# Patient Record
Sex: FEMALE | Race: ASIAN | NOT HISPANIC OR LATINO | URBAN - METROPOLITAN AREA
[De-identification: names, ages, dates, MRNs, and addresses within clinical notes are randomized per-mention and may not be internally consistent; named-entity substitution may affect disease eponyms.]

---

## 2017-01-18 RX ORDER — GLIMEPIRIDE 2 MG/1
TABLET ORAL
Qty: 30 TABLET | Refills: 2 | Status: SHIPPED | OUTPATIENT
Start: 2017-01-18 | End: 2017-02-27 | Stop reason: SDUPTHER

## 2017-02-24 ENCOUNTER — LAB (OUTPATIENT)
Dept: INTERNAL MEDICINE | Facility: CLINIC | Age: 57
End: 2017-02-24

## 2017-02-24 DIAGNOSIS — R06.02 SHORTNESS OF BREATH: ICD-10-CM

## 2017-02-24 DIAGNOSIS — R79.89 ABNORMAL LIVER FUNCTION TESTS: ICD-10-CM

## 2017-02-24 DIAGNOSIS — E78.5 HYPERLIPIDEMIA, UNSPECIFIED HYPERLIPIDEMIA TYPE: ICD-10-CM

## 2017-02-24 DIAGNOSIS — E11.9 CONTROLLED TYPE 2 DIABETES MELLITUS WITHOUT COMPLICATION, WITHOUT LONG-TERM CURRENT USE OF INSULIN (HCC): Primary | ICD-10-CM

## 2017-02-24 LAB
ALBUMIN SERPL-MCNC: 4.3 G/DL (ref 3.2–4.8)
ALBUMIN/GLOB SERPL: 1.9 G/DL (ref 1.5–2.5)
ALP SERPL-CCNC: 56 U/L (ref 25–100)
ALT SERPL W P-5'-P-CCNC: 30 U/L (ref 7–40)
ANION GAP SERPL CALCULATED.3IONS-SCNC: 4 MMOL/L (ref 3–11)
ARTICHOKE IGE QN: 118 MG/DL (ref 0–130)
AST SERPL-CCNC: 22 U/L (ref 0–33)
BACTERIA UR QL AUTO: ABNORMAL /HPF
BASOPHILS # BLD AUTO: 0.01 10*3/MM3 (ref 0–0.2)
BASOPHILS NFR BLD AUTO: 0.2 % (ref 0–1)
BILIRUB SERPL-MCNC: 0.5 MG/DL (ref 0.3–1.2)
BILIRUB UR QL STRIP: NEGATIVE
BUN BLD-MCNC: 14 MG/DL (ref 9–23)
BUN/CREAT SERPL: 23.3 (ref 7–25)
CALCIUM SPEC-SCNC: 9.9 MG/DL (ref 8.7–10.4)
CHLORIDE SERPL-SCNC: 107 MMOL/L (ref 99–109)
CHOLEST SERPL-MCNC: 207 MG/DL (ref 0–200)
CK SERPL-CCNC: 45 U/L (ref 26–174)
CLARITY UR: CLEAR
CO2 SERPL-SCNC: 33 MMOL/L (ref 20–31)
COLOR UR: YELLOW
CREAT BLD-MCNC: 0.6 MG/DL (ref 0.6–1.3)
DEPRECATED RDW RBC AUTO: 40.4 FL (ref 37–54)
EOSINOPHIL # BLD AUTO: 0.04 10*3/MM3 (ref 0.1–0.3)
EOSINOPHIL NFR BLD AUTO: 0.9 % (ref 0–3)
ERYTHROCYTE [DISTWIDTH] IN BLOOD BY AUTOMATED COUNT: 11.9 % (ref 11.3–14.5)
GFR SERPL CREATININE-BSD FRML MDRD: 102 ML/MIN/1.73
GFR SERPL CREATININE-BSD FRML MDRD: 124 ML/MIN/1.73
GLOBULIN UR ELPH-MCNC: 2.3 GM/DL
GLUCOSE BLD-MCNC: 127 MG/DL (ref 70–100)
GLUCOSE UR STRIP-MCNC: NEGATIVE MG/DL
HBA1C MFR BLD: 6.3 % (ref 4.8–5.6)
HCT VFR BLD AUTO: 42.1 % (ref 34.5–44)
HCV AB SER DONR QL: NORMAL
HDLC SERPL-MCNC: 74 MG/DL (ref 40–60)
HGB BLD-MCNC: 13.2 G/DL (ref 11.5–15.5)
HGB UR QL STRIP.AUTO: ABNORMAL
HYALINE CASTS UR QL AUTO: ABNORMAL /LPF
IMM GRANULOCYTES # BLD: 0.01 10*3/MM3 (ref 0–0.03)
IMM GRANULOCYTES NFR BLD: 0.2 % (ref 0–0.6)
KETONES UR QL STRIP: NEGATIVE
LEUKOCYTE ESTERASE UR QL STRIP.AUTO: ABNORMAL
LYMPHOCYTES # BLD AUTO: 1.3 10*3/MM3 (ref 0.6–4.8)
LYMPHOCYTES NFR BLD AUTO: 28 % (ref 24–44)
MCH RBC QN AUTO: 29.1 PG (ref 27–31)
MCHC RBC AUTO-ENTMCNC: 31.4 G/DL (ref 32–36)
MCV RBC AUTO: 92.9 FL (ref 80–99)
MONOCYTES # BLD AUTO: 0.27 10*3/MM3 (ref 0–1)
MONOCYTES NFR BLD AUTO: 5.8 % (ref 0–12)
NEUTROPHILS # BLD AUTO: 3.02 10*3/MM3 (ref 1.5–8.3)
NEUTROPHILS NFR BLD AUTO: 64.9 % (ref 41–71)
NITRITE UR QL STRIP: NEGATIVE
PH UR STRIP.AUTO: 6 [PH] (ref 5–8)
PLATELET # BLD AUTO: 264 10*3/MM3 (ref 150–450)
PMV BLD AUTO: 9.3 FL (ref 6–12)
POTASSIUM BLD-SCNC: 4.2 MMOL/L (ref 3.5–5.5)
PROT SERPL-MCNC: 6.6 G/DL (ref 5.7–8.2)
PROT UR QL STRIP: NEGATIVE
RBC # BLD AUTO: 4.53 10*6/MM3 (ref 3.89–5.14)
RBC # UR: ABNORMAL /HPF
REF LAB TEST METHOD: ABNORMAL
SODIUM BLD-SCNC: 144 MMOL/L (ref 132–146)
SP GR UR STRIP: 1.02 (ref 1–1.03)
SQUAMOUS #/AREA URNS HPF: ABNORMAL /HPF
TRIGL SERPL-MCNC: 94 MG/DL (ref 0–150)
TSH SERPL DL<=0.05 MIU/L-ACNC: 2.06 MIU/ML (ref 0.35–5.35)
UROBILINOGEN UR QL STRIP: ABNORMAL
WBC NRBC COR # BLD: 4.65 10*3/MM3 (ref 3.5–10.8)
WBC UR QL AUTO: ABNORMAL /HPF

## 2017-02-24 PROCEDURE — 86803 HEPATITIS C AB TEST: CPT | Performed by: INTERNAL MEDICINE

## 2017-02-24 PROCEDURE — 84443 ASSAY THYROID STIM HORMONE: CPT | Performed by: INTERNAL MEDICINE

## 2017-02-24 PROCEDURE — 81001 URINALYSIS AUTO W/SCOPE: CPT | Performed by: INTERNAL MEDICINE

## 2017-02-24 PROCEDURE — 85025 COMPLETE CBC W/AUTO DIFF WBC: CPT | Performed by: INTERNAL MEDICINE

## 2017-02-24 PROCEDURE — 80053 COMPREHEN METABOLIC PANEL: CPT | Performed by: INTERNAL MEDICINE

## 2017-02-24 PROCEDURE — 83036 HEMOGLOBIN GLYCOSYLATED A1C: CPT | Performed by: INTERNAL MEDICINE

## 2017-02-24 PROCEDURE — 82043 UR ALBUMIN QUANTITATIVE: CPT | Performed by: INTERNAL MEDICINE

## 2017-02-24 PROCEDURE — 82570 ASSAY OF URINE CREATININE: CPT | Performed by: INTERNAL MEDICINE

## 2017-02-24 PROCEDURE — 80061 LIPID PANEL: CPT | Performed by: INTERNAL MEDICINE

## 2017-02-24 PROCEDURE — 82550 ASSAY OF CK (CPK): CPT | Performed by: INTERNAL MEDICINE

## 2017-02-26 LAB
CREAT 24H UR-MCNC: 92.8 MG/DL
MICROALB/CRT. RATIO UR: 10.6 MG/G CREAT (ref 0–30)
MICROALBUMIN UR-MCNC: 9.8 UG/ML

## 2017-02-27 ENCOUNTER — OFFICE VISIT (OUTPATIENT)
Dept: INTERNAL MEDICINE | Facility: CLINIC | Age: 57
End: 2017-02-27

## 2017-02-27 VITALS
DIASTOLIC BLOOD PRESSURE: 70 MMHG | BODY MASS INDEX: 20.81 KG/M2 | HEIGHT: 60 IN | SYSTOLIC BLOOD PRESSURE: 118 MMHG | WEIGHT: 106 LBS

## 2017-02-27 DIAGNOSIS — E78.00 PURE HYPERCHOLESTEROLEMIA: ICD-10-CM

## 2017-02-27 DIAGNOSIS — Z00.00 PE (PHYSICAL EXAM), ROUTINE: Primary | ICD-10-CM

## 2017-02-27 DIAGNOSIS — Z12.11 ENCOUNTER FOR SCREENING COLONOSCOPY: ICD-10-CM

## 2017-02-27 DIAGNOSIS — Z78.0 MENOPAUSE: ICD-10-CM

## 2017-02-27 DIAGNOSIS — Z12.31 VISIT FOR SCREENING MAMMOGRAM: ICD-10-CM

## 2017-02-27 DIAGNOSIS — M85.80 OSTEOPENIA: ICD-10-CM

## 2017-02-27 DIAGNOSIS — E11.9 CONTROLLED TYPE 2 DIABETES MELLITUS WITHOUT COMPLICATION, WITHOUT LONG-TERM CURRENT USE OF INSULIN (HCC): ICD-10-CM

## 2017-02-27 PROCEDURE — 93000 ELECTROCARDIOGRAM COMPLETE: CPT | Performed by: INTERNAL MEDICINE

## 2017-02-27 PROCEDURE — 99396 PREV VISIT EST AGE 40-64: CPT | Performed by: INTERNAL MEDICINE

## 2017-02-27 RX ORDER — GLIMEPIRIDE 2 MG/1
2 TABLET ORAL
Qty: 30 TABLET | Refills: 5 | Status: SHIPPED | OUTPATIENT
Start: 2017-02-27 | End: 2017-10-02 | Stop reason: SDUPTHER

## 2017-02-27 NOTE — ASSESSMENT & PLAN NOTE
BG control bord with A1C 6.3; encouraged reg phys activity to decr insulin resistance, moderation in unhealthy starches/sweets; remains on met 1000mg BID #180, 5RF, glimepiride 2mg QD #30, 5RF; f/u A1C in 3 mos

## 2017-02-27 NOTE — PROGRESS NOTES
"Chief Complaint   Patient presents with   • Annual Exam       History of Present Illness  57 y.o.  woman presents for updated phys examination.  Notes trip to [facility] and lots of food.  Has not been exercising.  Has not been checking BGs but remains compliant with meds.  Cannot recall last mammo, within last few years.    Review of Systems  Denies headaches, visual changes, CP, palpitations, SOB, cough, abd pain, n/v/d, difficulty with urination, vaginal discharge/bleeding, numbness/tingling, falls, mood changes, lightheadedness, hearing changes, rashes.    Notes chronic irritation left ear with itching and flakiness, which she attributes to using cell phone in that ear.    Notes still fullness sensation in left eye and was told it would take up to 1 year to get back to baseline.  Had left eye surgery with Dr. Mendieta in 11/16 and has f/u in 3/17.     All other ROS reviewed and negative.    [facility]  The following portions of the patient's history were reviewed and updated as appropriate: allergies, current medications, past family history, past medical history, past social history, past surgical history and problem list.      Current Outpatient Prescriptions:   •  aspirin 81 MG tablet, Take  by mouth., Disp: , Rfl:   •  Calcium Carbonate-Vitamin D (CALCIUM 500 + D PO), Take  by mouth., Disp: , Rfl:   •  Cinnamon 500 MG capsule, Take 1 capsule by mouth daily., Disp: , Rfl:   •  glimepiride (AMARYL) 2 MG tablet, Take 1 tablet by mouth Every Morning Before Breakfast., Disp: 30 tablet, Rfl: 5  •  glucose blood (ACCU-CHEK NANCY PLUS) test strip, Test blood sugar once daily as directed, Disp: , Rfl:   •  metFORMIN (GLUCOPHAGE) 500 MG tablet, Take 2 tablets by mouth 2 (Two) Times a Day With Meals., Disp: 120 tablet, Rfl: 5  •  Omega-3 Fatty Acids (FISH OIL PO), Take  by mouth., Disp: , Rfl:     Visit Vitals   • /70   • Ht 59.5\" (151.1 cm)   • Wt 106 lb (48.1 kg)   • BMI 21.05 kg/m2       Physical Exam "   Constitutional: She is oriented to person, place, and time. She appears well-developed and well-nourished.   HENT:   Head: Normocephalic.   Right Ear: Tympanic membrane normal. No decreased hearing is noted.   Left Ear: Tympanic membrane normal. No decreased hearing is noted.   Nose: Nose normal.   Mouth/Throat: Oropharynx is clear and moist and mucous membranes are normal. No oropharyngeal exudate.   R. aud canal occluded by cerumen; L. aud canal with flaky mildly swollen erythema   Eyes: Conjunctivae and EOM are normal. Pupils are equal, round, and reactive to light.   bilat eyelids mildly puffy, mild ptosis, L>R but almost symmetric   Neck: Normal range of motion. Neck supple. Carotid bruit is not present. No thyromegaly present.   Cardiovascular: Normal rate, regular rhythm and normal heart sounds.    Pulmonary/Chest: Effort normal and breath sounds normal. No respiratory distress.   Abdominal: Soft. Bowel sounds are normal. She exhibits no distension and no mass. There is no hepatosplenomegaly. There is no tenderness.   Genitourinary:   Genitourinary Comments: Breast exam unremarkable without masses, skin changes, nipple discharge, or axillary adenopathy.     Musculoskeletal: Normal range of motion. She exhibits no edema.   Lymphadenopathy:     She has no cervical adenopathy.   Neurological: She is alert and oriented to person, place, and time. She has normal strength and normal reflexes. She displays normal reflexes. No cranial nerve deficit or sensory deficit.   Skin: Skin is warm and dry. No rash noted.   Psychiatric: She has a normal mood and affect. Her behavior is normal.   Nursing note and vitals reviewed.      LABS  Results for orders placed or performed in visit on 02/24/17   Comprehensive metabolic panel   Result Value Ref Range    Glucose 127 (H) 70 - 100 mg/dL    BUN 14 9 - 23 mg/dL    Creatinine 0.60 0.60 - 1.30 mg/dL    Sodium 144 132 - 146 mmol/L    Potassium 4.2 3.5 - 5.5 mmol/L    Chloride  107 99 - 109 mmol/L    CO2 33.0 (H) 20.0 - 31.0 mmol/L    Calcium 9.9 8.7 - 10.4 mg/dL    Total Protein 6.6 5.7 - 8.2 g/dL    Albumin 4.30 3.20 - 4.80 g/dL    ALT (SGPT) 30 7 - 40 U/L    AST (SGOT) 22 0 - 33 U/L    Alkaline Phosphatase 56 25 - 100 U/L    Total Bilirubin 0.5 0.3 - 1.2 mg/dL    eGFR Non African Amer 102 >60 mL/min/1.73    eGFR  African Amer 124 >60 mL/min/1.73    Globulin 2.3 gm/dL    A/G Ratio 1.9 1.5 - 2.5 g/dL    BUN/Creatinine Ratio 23.3 7.0 - 25.0    Anion Gap 4.0 3.0 - 11.0 mmol/L   TSH   Result Value Ref Range    TSH 2.060 0.350 - 5.350 mIU/mL   Lipid panel   Result Value Ref Range    Total Cholesterol 207 (H) 0 - 200 mg/dL    Triglycerides 94 0 - 150 mg/dL    HDL Cholesterol 74 (H) 40 - 60 mg/dL    LDL Cholesterol  118 0 - 130 mg/dL   Hepatitis C antibody   Result Value Ref Range    Hepatitis C Ab Non-Reactive Non-Reactive   Urinalysis With / Microscopic If Indicated   Result Value Ref Range    Color, UA Yellow Yellow, Straw    Appearance, UA Clear Clear    pH, UA 6.0 5.0 - 8.0    Specific Gravity, UA 1.018 1.001 - 1.030    Glucose, UA Negative Negative    Ketones, UA Negative Negative    Bilirubin, UA Negative Negative    Blood, UA Trace (A) Negative    Protein, UA Negative Negative    Leuk Esterase, UA Small (1+) (A) Negative    Nitrite, UA Negative Negative    Urobilinogen, UA 0.2 E.U./dL 0.2 - 1.0 E.U./dL   Hemoglobin A1c   Result Value Ref Range    Hemoglobin A1C 6.30 (H) 4.80 - 5.60 %   Microalbumin / Creatinine Urine Ratio   Result Value Ref Range    Creatinine, Urine 92.8 Not Estab. mg/dL    Microalbumin, Urine 9.8 Not Estab. ug/mL    Microalbumin/Creatinine Ratio Urine 10.6 0.0 - 30.0 mg/g creat   CK   Result Value Ref Range    Creatine Kinase 45 26 - 174 U/L   CBC Auto Differential   Result Value Ref Range    WBC 4.65 3.50 - 10.80 10*3/mm3    RBC 4.53 3.89 - 5.14 10*6/mm3    Hemoglobin 13.2 11.5 - 15.5 g/dL    Hematocrit 42.1 34.5 - 44.0 %    MCV 92.9 80.0 - 99.0 fL    MCH 29.1 27.0  - 31.0 pg    MCHC 31.4 (L) 32.0 - 36.0 g/dL    RDW 11.9 11.3 - 14.5 %    RDW-SD 40.4 37.0 - 54.0 fl    MPV 9.3 6.0 - 12.0 fL    Platelets 264 150 - 450 10*3/mm3    Neutrophil % 64.9 41.0 - 71.0 %    Lymphocyte % 28.0 24.0 - 44.0 %    Monocyte % 5.8 0.0 - 12.0 %    Eosinophil % 0.9 0.0 - 3.0 %    Basophil % 0.2 0.0 - 1.0 %    Immature Grans % 0.2 0.0 - 0.6 %    Neutrophils, Absolute 3.02 1.50 - 8.30 10*3/mm3    Lymphocytes, Absolute 1.30 0.60 - 4.80 10*3/mm3    Monocytes, Absolute 0.27 0.00 - 1.00 10*3/mm3    Eosinophils, Absolute 0.04 (L) 0.10 - 0.30 10*3/mm3    Basophils, Absolute 0.01 0.00 - 0.20 10*3/mm3    Immature Grans, Absolute 0.01 0.00 - 0.03 10*3/mm3   Urinalysis, Microscopic Only   Result Value Ref Range    RBC, UA 3-6 (A) None Seen, 0-2 /HPF    WBC, UA 3-5 (A) None Seen /HPF    Bacteria, UA None Seen None Seen, Trace /HPF    Squamous Epithelial Cells, UA 0-2 None Seen, 0-2 /HPF    Hyaline Casts, UA 0-6 0 - 6 /LPF    Methodology Automated Microscopy        ECG 12 Lead  Date/Time: 2/27/2017 3:12 PM  Performed by: MARK REHMAN  Authorized by: MARK REHMAN   Comparison: compared with previous ECG from 2/16/2016  Comparison to previous ECG: Resolved inverted T wave in V2  Rhythm: sinus rhythm  Rate: normal  BPM: 81  Conduction: conduction normal  ST Segments: ST segments normal  T Waves: T waves normal  QRS axis: normal  Clinical impression comment: stable EKG              ASSESSMENT/PLAN  Problem List Items Addressed This Visit     Controlled type 2 diabetes mellitus without complication     BG control bord with A1C 6.3; encouraged reg phys activity to decr insulin resistance, moderation in unhealthy starches/sweets; remains on met 1000mg BID #180, 5RF, glimepiride 2mg QD #30, 5RF; f/u A1C in 3 mos           Relevant Medications    metFORMIN (GLUCOPHAGE) 500 MG tablet    glimepiride (AMARYL) 2 MG tablet    Hyperlipidemia     Lipids excellent; no meds         Relevant Orders    ECG 12 Lead    Menopause     Relevant Orders    DEXA Bone Density Axial    Osteopenia     Calcium and vitamin D supplementation with weight-bearing exercise; DEXA ordered            PE (physical exam), routine - Primary     Health maintenance - flu vacc 12/16; Tdap 4/11, PVX given today; rec Zostavax after turns 58; mammo order given to patient; Pap 2/16, repeat 2019 unless abnlities; DEXA ordered (9/14); colonosc due with Dr. Reyes (last 5/11, (+)FH), eye exam with Dr. Mendieta 11/16, with f/u 3/17; dental exam 1/17, done every 6 mos           Other Visit Diagnoses     Visit for screening mammogram        Relevant Orders    Mammo Screening Digital Tomosynthesis Bilateral With CAD    Encounter for screening colonoscopy        Relevant Orders    Ambulatory Referral to Colorectal Surgery          FOLLOW-UP   RTC 3 mos with A1C; f/u A1C and lipids in 6 mos    Electronically signed by:    Jacinta Lopez MD  02/27/2017

## 2017-02-27 NOTE — ASSESSMENT & PLAN NOTE
Health maintenance - flu vacc 12/16; Tdap 4/11, PVX given today; rec Zostavax after turns 58; mammo order given to patient; Pap 2/16, repeat 2019 unless abnlities; DEXA ordered (9/14); colonosc due with Dr. Reyes (last 5/11, (+)), eye exam with Dr. Mendieta 11/16, with f/u 3/17; dental exam 1/17, done every 6 mos

## 2017-06-23 ENCOUNTER — OFFICE VISIT (OUTPATIENT)
Dept: INTERNAL MEDICINE | Facility: CLINIC | Age: 57
End: 2017-06-23

## 2017-06-23 VITALS
DIASTOLIC BLOOD PRESSURE: 72 MMHG | BODY MASS INDEX: 19.63 KG/M2 | WEIGHT: 100 LBS | SYSTOLIC BLOOD PRESSURE: 110 MMHG | HEIGHT: 60 IN

## 2017-06-23 DIAGNOSIS — E11.9 CONTROLLED TYPE 2 DIABETES MELLITUS WITHOUT COMPLICATION, WITHOUT LONG-TERM CURRENT USE OF INSULIN (HCC): Primary | ICD-10-CM

## 2017-06-23 DIAGNOSIS — E78.00 PURE HYPERCHOLESTEROLEMIA: ICD-10-CM

## 2017-06-23 LAB — HBA1C MFR BLD: 6.2 %

## 2017-06-23 PROCEDURE — 99213 OFFICE O/P EST LOW 20 MIN: CPT | Performed by: INTERNAL MEDICINE

## 2017-06-23 PROCEDURE — 83036 HEMOGLOBIN GLYCOSYLATED A1C: CPT | Performed by: INTERNAL MEDICINE

## 2017-06-23 NOTE — PROGRESS NOTES
"Chief Complaint   Patient presents with   • Follow-up     Diabetes        History of Present Illness  57 y.o.  woman, accompanied by her , presents for DM follow-up.  Has not been checking a lot of BGs, but FBGs have been in the 120s.  Denies low sugars or other concerns today.    Review of Systems  ROS neg for CP, palpitations, SOB, headaches, numbness/tingling.    Has ongoing eye issues barbara on the left side.  Had previous retinal surgery and then more recent cataract surgery.  Will just be monitoring the right cataract for now.  Also on eye drops for glaucoma.  Still have spots she cannot see; also worried about elevated eye pressures, noting the most recent was decreased.  Sees Dr. Mendieta every 3 mos. All other ROS reviewed and negative.    [facility]  The following portions of the patient's history were reviewed and updated as appropriate: allergies, current medications, past family history, past medical history, past social history, past surgical history and problem list.      Current Outpatient Prescriptions:   •  aspirin 81 MG tablet, Take  by mouth., Disp: , Rfl:   •  Calcium Carbonate-Vitamin D (CALCIUM 500 + D PO), Take  by mouth., Disp: , Rfl:   •  Cinnamon 500 MG capsule, Take 1 capsule by mouth daily., Disp: , Rfl:   •  glimepiride (AMARYL) 2 MG tablet, Take 1 tablet by mouth Every Morning Before Breakfast., Disp: 30 tablet, Rfl: 5  •  glucose blood (ACCU-CHEK NANCY PLUS) test strip, Test blood sugar once daily as directed, Disp: , Rfl:   •  metFORMIN (GLUCOPHAGE) 500 MG tablet, Take 2 tablets by mouth 2 (Two) Times a Day With Meals., Disp: 120 tablet, Rfl: 5  •  Omega-3 Fatty Acids (FISH OIL PO), Take  by mouth., Disp: , Rfl:     VITALS:  /72  Ht 59.5\" (151.1 cm)  Wt 100 lb (45.4 kg)  BMI 19.86 kg/m2    Physical Exam   Constitutional: She is oriented to person, place, and time. She appears well-developed and well-nourished.   Eyes: Conjunctivae and EOM are normal.   Cardiovascular: " Normal rate, regular rhythm and normal heart sounds.    Pulmonary/Chest: Effort normal and breath sounds normal.   Abdominal: Soft. Bowel sounds are normal.   Neurological: She is alert and oriented to person, place, and time.   Psychiatric: She has a normal mood and affect. Her behavior is normal.   Nursing note and vitals reviewed.      LABS  Results for orders placed or performed in visit on 06/23/17   POC Glycosylated Hemoglobin (Hb A1C)   Result Value Ref Range    Hemoglobin A1C 6.2 %   2/17 A1C 6.3,     ASSESSMENT/PLAN  Problem List Items Addressed This Visit     Controlled type 2 diabetes mellitus without complication - Primary     BG control stable with A1C 6.2; remains on met 500mg BID and glimepiride 2mg QD; encouraged reg phys activity to decr insulin resistance, moderation in unhealthy starches/sweets; f/u A1C in 3 mos           Relevant Orders    POC Glycosylated Hemoglobin (Hb A1C) (Completed)    Hyperlipidemia     F/u lipids in 3 mos with goal LDL < 100 (was 118 in 2/17)               FOLLOW-UP  RTC 3 mos with A1C, lipids (escribed)    Electronically signed by:    Jacinta Lopez MD  06/23/2017

## 2017-06-23 NOTE — ASSESSMENT & PLAN NOTE
BG control stable with A1C 6.2; remains on met 500mg BID and glimepiride 2mg QD; encouraged reg phys activity to decr insulin resistance, moderation in unhealthy starches/sweets; f/u A1C in 3 mos

## 2017-09-02 DIAGNOSIS — E11.9 CONTROLLED TYPE 2 DIABETES MELLITUS WITHOUT COMPLICATION, WITHOUT LONG-TERM CURRENT USE OF INSULIN (HCC): ICD-10-CM

## 2017-09-21 ENCOUNTER — LAB (OUTPATIENT)
Dept: INTERNAL MEDICINE | Facility: CLINIC | Age: 57
End: 2017-09-21

## 2017-09-21 DIAGNOSIS — E11.9 CONTROLLED TYPE 2 DIABETES MELLITUS WITHOUT COMPLICATION, WITHOUT LONG-TERM CURRENT USE OF INSULIN (HCC): ICD-10-CM

## 2017-09-21 DIAGNOSIS — E78.00 PURE HYPERCHOLESTEROLEMIA: ICD-10-CM

## 2017-09-21 LAB
ARTICHOKE IGE QN: 118 MG/DL (ref 0–130)
CHOLEST SERPL-MCNC: 181 MG/DL (ref 0–200)
HBA1C MFR BLD: 6.6 % (ref 4.8–5.6)
HDLC SERPL-MCNC: 49 MG/DL (ref 40–60)
TRIGL SERPL-MCNC: 104 MG/DL (ref 0–150)

## 2017-09-21 PROCEDURE — 80061 LIPID PANEL: CPT | Performed by: INTERNAL MEDICINE

## 2017-09-21 PROCEDURE — 83036 HEMOGLOBIN GLYCOSYLATED A1C: CPT | Performed by: INTERNAL MEDICINE

## 2017-09-25 PROBLEM — H69.82 DYSFUNCTION OF LEFT EUSTACHIAN TUBE: Status: ACTIVE | Noted: 2017-09-25

## 2017-09-25 PROBLEM — M26.609 TMJ DYSFUNCTION: Status: ACTIVE | Noted: 2017-09-25

## 2017-09-25 PROBLEM — H90.3 SNHL (SENSORY-NEURAL HEARING LOSS), ASYMMETRICAL: Status: ACTIVE | Noted: 2017-09-25

## 2017-09-26 ENCOUNTER — OFFICE VISIT (OUTPATIENT)
Dept: INTERNAL MEDICINE | Facility: CLINIC | Age: 57
End: 2017-09-26

## 2017-09-26 VITALS
WEIGHT: 104.28 LBS | BODY MASS INDEX: 20.71 KG/M2 | DIASTOLIC BLOOD PRESSURE: 76 MMHG | SYSTOLIC BLOOD PRESSURE: 112 MMHG

## 2017-09-26 DIAGNOSIS — E78.00 PURE HYPERCHOLESTEROLEMIA: ICD-10-CM

## 2017-09-26 DIAGNOSIS — M85.80 OSTEOPENIA: ICD-10-CM

## 2017-09-26 DIAGNOSIS — Z78.0 MENOPAUSE: ICD-10-CM

## 2017-09-26 DIAGNOSIS — Z12.31 VISIT FOR SCREENING MAMMOGRAM: ICD-10-CM

## 2017-09-26 DIAGNOSIS — E11.9 CONTROLLED TYPE 2 DIABETES MELLITUS WITHOUT COMPLICATION, WITHOUT LONG-TERM CURRENT USE OF INSULIN (HCC): Primary | ICD-10-CM

## 2017-09-26 PROCEDURE — 90686 IIV4 VACC NO PRSV 0.5 ML IM: CPT | Performed by: INTERNAL MEDICINE

## 2017-09-26 PROCEDURE — 99214 OFFICE O/P EST MOD 30 MIN: CPT | Performed by: INTERNAL MEDICINE

## 2017-09-26 PROCEDURE — 90471 IMMUNIZATION ADMIN: CPT | Performed by: INTERNAL MEDICINE

## 2017-09-26 NOTE — ASSESSMENT & PLAN NOTE
BG control worsened with A1C 6.6 (Was 6.2 in 6/17); remains on met 500mg 2 BID w/ meals and glimepiride to 2mg QD; encouraged patient to resume some sort of aerobic exercise with some resistance training; even starting 10-15 minutes 2-3x/week would be good; f/u A1C in 3 mos

## 2017-09-26 NOTE — PROGRESS NOTES
Chief Complaint   Patient presents with   • Follow-up     Hyperlipidemia, diabetes        History of Present Illness  57 y.o.  woman presents for DM follow-up.  FBGs have been in the 100s.  Notes no exercise since left eye surgery in 1/17.  Just prior to that, she had started swimming exercise and then developed the eye situation.  States she wore goggles to swim but now is afraid to go swimming underwater again.  Has bike at home but again, no recent exercise.    Review of Systems  Left eye resolving.  Denies CP, palpitations, SOB, lightheadedness, abd pain, n/v/d.  All other ROS reviewed and negative.    [facility]  The following portions of the patient's history were reviewed and updated as appropriate: allergies, current medications, past family history, past medical history, past social history, past surgical history and problem list.      Current Outpatient Prescriptions:   •  aspirin 81 MG tablet, Take  by mouth., Disp: , Rfl:   •  Calcium Carbonate-Vitamin D (CALCIUM 500 + D PO), Take  by mouth., Disp: , Rfl:   •  Cinnamon 500 MG capsule, Take 1 capsule by mouth daily., Disp: , Rfl:   •  glimepiride (AMARYL) 2 MG tablet, Take 1 tablet by mouth Every Morning Before Breakfast., Disp: 30 tablet, Rfl: 5  •  glucose blood (ACCU-CHEK NANCY PLUS) test strip, Test blood sugar once daily as directed, Disp: , Rfl:   •  metFORMIN (GLUCOPHAGE) 500 MG tablet, TAKE 2 TABLETS BY MOUTH TWO TIMES A DAY WITH MEALS, Disp: 120 tablet, Rfl: 4  •  Omega-3 Fatty Acids (FISH OIL PO), Take  by mouth., Disp: , Rfl:     VITALS:  /76  Wt 104 lb 4.4 oz (47.3 kg)  BMI 20.71 kg/m2    Physical Exam   Constitutional: She is oriented to person, place, and time. She appears well-developed and well-nourished.   Eyes: Conjunctivae and EOM are normal.   Cardiovascular: Normal rate, regular rhythm and normal heart sounds.    Pulmonary/Chest: Effort normal and breath sounds normal. No respiratory distress.   Abdominal: Soft. Bowel sounds  are normal. She exhibits no distension. There is no tenderness.   Neurological: She is alert and oriented to person, place, and time.   Psychiatric: She has a normal mood and affect. Her behavior is normal.   Nursing note and vitals reviewed.      LABS  Results for orders placed or performed in visit on 09/21/17   Lipid Panel   Result Value Ref Range    Total Cholesterol 181 0 - 200 mg/dL    Triglycerides 104 0 - 150 mg/dL    HDL Cholesterol 49 40 - 60 mg/dL    LDL Cholesterol  118 0 - 130 mg/dL   Hemoglobin A1c   Result Value Ref Range    Hemoglobin A1C 6.60 (H) 4.80 - 5.60 %   6/17 A1C 6.2  3/17     ASSESSMENT/PLAN  Problem List Items Addressed This Visit     Controlled type 2 diabetes mellitus without complication - Primary     BG control worsened with A1C 6.6 (Was 6.2 in 6/17); remains on met 500mg 2 BID w/ meals and glimepiride to 2mg QD; encouraged patient to resume some sort of aerobic exercise with some resistance training; even starting 10-15 minutes 2-3x/week would be good; f/u A1C in 3 mos         Hyperlipidemia     Lipids unchanged/bord with  and goal LDL < 100; no meds; decrease saturated fats and cholesterol in the diet; repeat lipids in 6 mos           Menopause    Relevant Orders    DEXA Bone Density Axial    Osteopenia     Overdue for updated DEXA           Other Visit Diagnoses     Visit for screening mammogram        Relevant Orders    Mammo Screening Digital Tomosynthesis Bilateral With CAD          FOLLOW-UP  1. Health maintenance - flu vacc given today; plan for PVX at next visit (patient declined today)  2. RTC 3 mos with A1C and PVX  3. RTC 6 mos for updated PE    Electronically signed by:    Jacinta Lopez MD  09/26/2017

## 2017-09-26 NOTE — ASSESSMENT & PLAN NOTE
Lipids unchanged/bord with  and goal LDL < 100; no meds; decrease saturated fats and cholesterol in the diet; repeat lipids in 6 mos

## 2017-10-02 DIAGNOSIS — E11.9 CONTROLLED TYPE 2 DIABETES MELLITUS WITHOUT COMPLICATION, WITHOUT LONG-TERM CURRENT USE OF INSULIN (HCC): ICD-10-CM

## 2017-10-02 RX ORDER — GLIMEPIRIDE 2 MG/1
TABLET ORAL
Qty: 30 TABLET | Refills: 4 | Status: SHIPPED | OUTPATIENT
Start: 2017-10-02 | End: 2018-01-05 | Stop reason: SDUPTHER

## 2017-10-02 RX ORDER — GLIMEPIRIDE 2 MG/1
TABLET ORAL
Qty: 30 TABLET | Refills: 4 | OUTPATIENT
Start: 2017-10-02

## 2017-10-20 ENCOUNTER — HOSPITAL ENCOUNTER (OUTPATIENT)
Dept: BONE DENSITY | Facility: HOSPITAL | Age: 57
Discharge: HOME | End: 2017-10-20
Attending: INTERNAL MEDICINE

## 2017-10-20 ENCOUNTER — HOSPITAL ENCOUNTER (OUTPATIENT)
Dept: MAMMOGRAPHY | Facility: HOSPITAL | Age: 57
Discharge: HOME | End: 2017-10-20
Attending: INTERNAL MEDICINE | Admitting: INTERNAL MEDICINE

## 2017-10-20 DIAGNOSIS — Z12.31 VISIT FOR SCREENING MAMMOGRAM: ICD-10-CM

## 2017-10-20 DIAGNOSIS — Z78.0 MENOPAUSE: ICD-10-CM

## 2017-10-20 PROCEDURE — 77080 DXA BONE DENSITY AXIAL: CPT

## 2017-10-20 PROCEDURE — 77063 BREAST TOMOSYNTHESIS BI: CPT

## 2017-10-20 PROCEDURE — G0202 SCR MAMMO BI INCL CAD: HCPCS

## 2017-10-23 PROBLEM — E11.319 DIABETIC RETINOPATHY OF BOTH EYES ASSOCIATED WITH TYPE 2 DIABETES MELLITUS (HCC): Status: ACTIVE | Noted: 2017-10-23

## 2017-10-23 PROCEDURE — 77067 SCR MAMMO BI INCL CAD: CPT | Performed by: RADIOLOGY

## 2017-10-23 PROCEDURE — 77063 BREAST TOMOSYNTHESIS BI: CPT | Performed by: RADIOLOGY

## 2017-10-25 NOTE — PROGRESS NOTES
Dear Sarah,    Thank you for obtaining your DEXA (bone density) scan.  I have received those results and would like to review them with you.      Your bone density remains in the osteopenic range, which is between normal and osteoporosis.  It is, however, slightly worsened as compared to your last DEXA in 2014.  You are still osteopenic, but now at the doorstep of osteoporosis.    The values indicate that your risk of a major fracture in the next 10 years is 9.4%.    Please maintain regular calcium and vitamin D supplementation.  Calcium intake should be 1000mg per day between diet and supplements.  Weight bearing exercise is good for bone health as well.  It is very important that you work on these ways to keep your bones healthy to decrease risk of fractures.    We should plan to repeat your DEXA in 2-3 years and will plan to start osteoporosis medication if the bone density declines any further.  Please let me know if you have any questions or concerns regarding these results.        Sincerely,  Jacinta Lopez MD

## 2018-01-05 ENCOUNTER — OFFICE VISIT (OUTPATIENT)
Dept: INTERNAL MEDICINE | Facility: CLINIC | Age: 58
End: 2018-01-05

## 2018-01-05 VITALS
RESPIRATION RATE: 16 BRPM | HEIGHT: 60 IN | SYSTOLIC BLOOD PRESSURE: 102 MMHG | BODY MASS INDEX: 20.03 KG/M2 | DIASTOLIC BLOOD PRESSURE: 62 MMHG | OXYGEN SATURATION: 99 % | HEART RATE: 88 BPM | WEIGHT: 102 LBS

## 2018-01-05 DIAGNOSIS — H01.004 BLEPHARITIS OF LEFT UPPER EYELID, UNSPECIFIED TYPE: ICD-10-CM

## 2018-01-05 DIAGNOSIS — E11.319 CONTROLLED TYPE 2 DIABETES MELLITUS WITH RETINOPATHY, WITHOUT LONG-TERM CURRENT USE OF INSULIN, MACULAR EDEMA PRESENCE UNSPECIFIED, UNSPECIFIED LATERALITY, UNSPECIFIED RETINOPATHY SEVERITY (HCC): Primary | ICD-10-CM

## 2018-01-05 DIAGNOSIS — E78.00 PURE HYPERCHOLESTEROLEMIA: ICD-10-CM

## 2018-01-05 DIAGNOSIS — L30.9 HAND ECZEMA: ICD-10-CM

## 2018-01-05 DIAGNOSIS — Z00.00 ROUTINE HEALTH MAINTENANCE: ICD-10-CM

## 2018-01-05 LAB
EXPIRATION DATE: ABNORMAL
HBA1C MFR BLD: 6.3 %
Lab: ABNORMAL

## 2018-01-05 PROCEDURE — 90732 PPSV23 VACC 2 YRS+ SUBQ/IM: CPT | Performed by: INTERNAL MEDICINE

## 2018-01-05 PROCEDURE — 90471 IMMUNIZATION ADMIN: CPT | Performed by: INTERNAL MEDICINE

## 2018-01-05 PROCEDURE — 83036 HEMOGLOBIN GLYCOSYLATED A1C: CPT | Performed by: INTERNAL MEDICINE

## 2018-01-05 PROCEDURE — 99214 OFFICE O/P EST MOD 30 MIN: CPT | Performed by: INTERNAL MEDICINE

## 2018-01-05 RX ORDER — GLIMEPIRIDE 2 MG/1
2 TABLET ORAL
Qty: 90 TABLET | Refills: 3 | Status: SHIPPED | OUTPATIENT
Start: 2018-01-05 | End: 2018-04-13 | Stop reason: SDUPTHER

## 2018-01-05 NOTE — ASSESSMENT & PLAN NOTE
BG control stable/improved with A1C 6.3; remains on met 1000mg BID and glmepiride 2mg QD; encouraged reg phys activity to decr insulin resistance, moderation in unhealthy starches/sweets; f/u A1C in 3 mos with next PE

## 2018-01-05 NOTE — PROGRESS NOTES
"Chief Complaint   Patient presents with   • Diabetes       History of Present Illness  57 y.o.  woman presents for DM follow-up.  BGs have been 120s.  Notes holiday eating. Reports compliance with DM meds.    Reports complicated long course form left eye surgery.  Just had laser on right eye and had better outcomes; can see better.  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    Notes left outer corner of eye with redness, itching, and tearing yesterday; today with crusting, decreased redness, decreased itching.    Review of Systems  Denies CP, palpitations, SOB, visual changes, numbness/tingling, falls.  ROS (+) for outer left eye irritation as above.     ROS (+) for redness and dry skin at back of hands.  Sometimes with flakiness behind ears. All other ROS reviewed and negative.    [facility]  The following portions of the patient's history were reviewed and updated as appropriate: allergies, current medications, past family history, past medical history, past social history, past surgical history and problem list.    Current Outpatient Prescriptions:   •  aspirin 81 MG tablet, QD  •  Calcium Carbonate-Vitamin D (CALCIUM 500 + D PO), QD  •  Cinnamon 500 MG capsule, TQD  •  glimepiride (AMARYL) 2 MG tablet, QAM  •  glucose blood (ACCU-CHEK NANCY PLUS) test strip, QD  •  metFORMIN (GLUCOPHAGE) 500 MG tablet, 2 BID  •  Omega-3 Fatty Acids (FISH OIL PO), QD    VITALS:  /62  Pulse 88  Resp 16  Ht 151.1 cm (59.5\")  Wt 46.3 kg (102 lb)  SpO2 99%  BMI 20.26 kg/m2    Physical Exam   Constitutional: She appears well-developed and well-nourished.   Thin body stature   Eyes: Conjunctivae and EOM are normal.       Cardiovascular: Normal rate, regular rhythm and normal heart sounds.    Pulmonary/Chest: Effort normal and breath sounds normal.   Abdominal: Soft. Bowel sounds are normal.    Sarah had a diabetic foot exam performed today.    Vascular Status -  Her exam exhibits no right foot edema. Her " exam exhibits no left foot edema.   Skin Integrity  -  Her right foot skin is intact.     Sarah 's left foot skin is intact. .  Neurological: She is alert.   Psychiatric: She has a normal mood and affect. Her behavior is normal.   Nursing note and vitals reviewed.      LABS  Results for orders placed or performed in visit on 01/05/18   POC Glycosylated Hemoglobin (Hb A1C)   Result Value Ref Range    Hemoglobin A1C 6.3 %    Lot Number [id number]     Expiration Date 09/01/2019      Results for orders placed or performed in visit on 09/21/17   Lipid Panel   Result Value Ref Range    Total Cholesterol 181 0 - 200 mg/dL    Triglycerides 104 0 - 150 mg/dL    HDL Cholesterol 49 40 - 60 mg/dL    LDL Cholesterol  118 0 - 130 mg/dL   Hemoglobin A1c   Result Value Ref Range    Hemoglobin A1C 6.60 (H) 4.80 - 5.60 %       ASSESSMENT/PLAN  Problem List Items Addressed This Visit     Controlled type 2 diabetes mellitus with retinopathy, without long-term current use of insulin - Primary     BG control stable/improved with A1C 6.3; remains on met 1000mg BID and glmepiride 2mg QD; encouraged reg phys activity to decr insulin resistance, moderation in unhealthy starches/sweets; f/u A1C in 3 mos with next PE           Relevant Medications    glimepiride (AMARYL) 2 MG tablet    metFORMIN (GLUCOPHAGE) 500 MG tablet    sulfacetaminde-prednisolone (BLEPHAMIDE) 10-0.2 % ophthalmic ointment    Other Relevant Orders    POC Glycosylated Hemoglobin (Hb A1C) (Completed)      Other Visit Diagnoses     Blepharitis of left upper eyelid        local irritation vs blepharitis; RX blephamide; f/u ophtho if recurs    Relevant Medications    sulfacetaminde-prednisolone (BLEPHAMIDE) 10-0.2 % ophthalmic ointment    Hand eczema        increase moisturizing consistency, creams better than lotions          FOLLOW-UP  1. Health maintenance - 9/17  2. RTC 3 mos for updated PE; fasting labs wk priort to appt (CBC, CMP, TSH, lipids, UA/micr, microalb, A1C, B12) -  escribed    Electronically signed by:    Jacinta Lopez MD  01/05/2018

## 2018-01-21 PROBLEM — L30.9 DERMATITIS: Status: ACTIVE | Noted: 2018-01-21

## 2018-03-07 PROBLEM — H01.139 ECZEMA OF EYELID: Status: ACTIVE | Noted: 2018-03-07

## 2018-03-08 ENCOUNTER — TELEPHONE (OUTPATIENT)
Dept: INTERNAL MEDICINE | Facility: CLINIC | Age: 58
End: 2018-03-08

## 2018-03-08 RX ORDER — TRIAMCINOLONE ACETONIDE 1 MG/G
CREAM TOPICAL
Start: 2018-03-08 | End: 2018-04-13

## 2018-03-08 RX ORDER — HYDROCORTISONE 25 MG/ML
LOTION TOPICAL
Start: 2018-03-08 | End: 2018-04-13

## 2018-03-08 NOTE — TELEPHONE ENCOUNTER
----- Message from Jacinta Lopez MD sent at 3/7/2018 11:26 PM EST -----  Regarding: med list update  Per derm Dr. Werner  Hydrocortisone 2.5% ointment bid x 7d to eyelids    Triamcinolone acetonide 0.1% cream bid x 2 wks for eczema

## 2018-04-12 ENCOUNTER — LAB (OUTPATIENT)
Dept: INTERNAL MEDICINE | Facility: CLINIC | Age: 58
End: 2018-04-12

## 2018-04-12 DIAGNOSIS — Z00.00 ROUTINE HEALTH MAINTENANCE: ICD-10-CM

## 2018-04-12 DIAGNOSIS — E78.00 PURE HYPERCHOLESTEROLEMIA: ICD-10-CM

## 2018-04-12 DIAGNOSIS — E11.319 CONTROLLED TYPE 2 DIABETES MELLITUS WITH RETINOPATHY, WITHOUT LONG-TERM CURRENT USE OF INSULIN, MACULAR EDEMA PRESENCE UNSPECIFIED, UNSPECIFIED LATERALITY, UNSPECIFIED RETINOPATHY SEVERITY (HCC): ICD-10-CM

## 2018-04-12 LAB
ALBUMIN SERPL-MCNC: 4.7 G/DL (ref 3.2–4.8)
ALBUMIN/GLOB SERPL: 1.8 G/DL (ref 1.5–2.5)
ALP SERPL-CCNC: 54 U/L (ref 25–100)
ALT SERPL W P-5'-P-CCNC: 30 U/L (ref 7–40)
ANION GAP SERPL CALCULATED.3IONS-SCNC: 7 MMOL/L (ref 3–11)
ARTICHOKE IGE QN: 108 MG/DL (ref 0–130)
AST SERPL-CCNC: 23 U/L (ref 0–33)
BACTERIA UR QL AUTO: NORMAL /HPF
BASOPHILS # BLD AUTO: 0.03 10*3/MM3 (ref 0–0.2)
BASOPHILS NFR BLD AUTO: 0.4 % (ref 0–1)
BILIRUB SERPL-MCNC: 0.4 MG/DL (ref 0.3–1.2)
BILIRUB UR QL STRIP: NEGATIVE
BUN BLD-MCNC: 16 MG/DL (ref 9–23)
BUN/CREAT SERPL: 26.7 (ref 7–25)
CALCIUM SPEC-SCNC: 9.6 MG/DL (ref 8.7–10.4)
CHLORIDE SERPL-SCNC: 103 MMOL/L (ref 99–109)
CHOLEST SERPL-MCNC: 189 MG/DL (ref 0–200)
CLARITY UR: CLEAR
CO2 SERPL-SCNC: 31 MMOL/L (ref 20–31)
COLOR UR: YELLOW
CREAT BLD-MCNC: 0.6 MG/DL (ref 0.6–1.3)
DEPRECATED RDW RBC AUTO: 41.5 FL (ref 37–54)
EOSINOPHIL # BLD AUTO: 0.04 10*3/MM3 (ref 0–0.3)
EOSINOPHIL NFR BLD AUTO: 0.6 % (ref 0–3)
ERYTHROCYTE [DISTWIDTH] IN BLOOD BY AUTOMATED COUNT: 12.4 % (ref 11.3–14.5)
GFR SERPL CREATININE-BSD FRML MDRD: 102 ML/MIN/1.73
GFR SERPL CREATININE-BSD FRML MDRD: 124 ML/MIN/1.73
GLOBULIN UR ELPH-MCNC: 2.6 GM/DL
GLUCOSE BLD-MCNC: 164 MG/DL (ref 70–100)
GLUCOSE UR STRIP-MCNC: NEGATIVE MG/DL
HBA1C MFR BLD: 6.9 % (ref 4.8–5.6)
HCT VFR BLD AUTO: 42.9 % (ref 34.5–44)
HDLC SERPL-MCNC: 67 MG/DL (ref 40–60)
HGB BLD-MCNC: 13.3 G/DL (ref 11.5–15.5)
HGB UR QL STRIP.AUTO: NEGATIVE
HYALINE CASTS UR QL AUTO: NORMAL /LPF
IMM GRANULOCYTES # BLD: 0.01 10*3/MM3 (ref 0–0.03)
IMM GRANULOCYTES NFR BLD: 0.1 % (ref 0–0.6)
KETONES UR QL STRIP: NEGATIVE
LEUKOCYTE ESTERASE UR QL STRIP.AUTO: ABNORMAL
LYMPHOCYTES # BLD AUTO: 1.55 10*3/MM3 (ref 0.6–4.8)
LYMPHOCYTES NFR BLD AUTO: 23.1 % (ref 24–44)
MCH RBC QN AUTO: 28.4 PG (ref 27–31)
MCHC RBC AUTO-ENTMCNC: 31 G/DL (ref 32–36)
MCV RBC AUTO: 91.5 FL (ref 80–99)
MONOCYTES # BLD AUTO: 0.29 10*3/MM3 (ref 0–1)
MONOCYTES NFR BLD AUTO: 4.3 % (ref 0–12)
NEUTROPHILS # BLD AUTO: 4.8 10*3/MM3 (ref 1.5–8.3)
NEUTROPHILS NFR BLD AUTO: 71.5 % (ref 41–71)
NITRITE UR QL STRIP: NEGATIVE
PH UR STRIP.AUTO: 7 [PH] (ref 5–8)
PLATELET # BLD AUTO: 262 10*3/MM3 (ref 150–450)
PMV BLD AUTO: 9.8 FL (ref 6–12)
POTASSIUM BLD-SCNC: 4.4 MMOL/L (ref 3.5–5.5)
PROT SERPL-MCNC: 7.3 G/DL (ref 5.7–8.2)
PROT UR QL STRIP: NEGATIVE
RBC # BLD AUTO: 4.69 10*6/MM3 (ref 3.89–5.14)
RBC # UR: NORMAL /HPF
REF LAB TEST METHOD: NORMAL
SODIUM BLD-SCNC: 141 MMOL/L (ref 132–146)
SP GR UR STRIP: 1.01 (ref 1–1.03)
SQUAMOUS #/AREA URNS HPF: NORMAL /HPF
TRIGL SERPL-MCNC: 74 MG/DL (ref 0–150)
TSH SERPL DL<=0.05 MIU/L-ACNC: 1.12 MIU/ML (ref 0.35–5.35)
UROBILINOGEN UR QL STRIP: ABNORMAL
VIT B12 BLD-MCNC: 778 PG/ML (ref 211–911)
WBC NRBC COR # BLD: 6.72 10*3/MM3 (ref 3.5–10.8)
WBC UR QL AUTO: NORMAL /HPF

## 2018-04-12 PROCEDURE — 84443 ASSAY THYROID STIM HORMONE: CPT | Performed by: INTERNAL MEDICINE

## 2018-04-12 PROCEDURE — 80053 COMPREHEN METABOLIC PANEL: CPT | Performed by: INTERNAL MEDICINE

## 2018-04-12 PROCEDURE — 83036 HEMOGLOBIN GLYCOSYLATED A1C: CPT | Performed by: INTERNAL MEDICINE

## 2018-04-12 PROCEDURE — 82607 VITAMIN B-12: CPT | Performed by: INTERNAL MEDICINE

## 2018-04-12 PROCEDURE — 82043 UR ALBUMIN QUANTITATIVE: CPT | Performed by: INTERNAL MEDICINE

## 2018-04-12 PROCEDURE — 80061 LIPID PANEL: CPT | Performed by: INTERNAL MEDICINE

## 2018-04-12 PROCEDURE — 85025 COMPLETE CBC W/AUTO DIFF WBC: CPT | Performed by: INTERNAL MEDICINE

## 2018-04-12 PROCEDURE — 82570 ASSAY OF URINE CREATININE: CPT | Performed by: INTERNAL MEDICINE

## 2018-04-12 PROCEDURE — 81001 URINALYSIS AUTO W/SCOPE: CPT | Performed by: INTERNAL MEDICINE

## 2018-04-13 ENCOUNTER — OFFICE VISIT (OUTPATIENT)
Dept: INTERNAL MEDICINE | Facility: CLINIC | Age: 58
End: 2018-04-13

## 2018-04-13 VITALS
HEART RATE: 82 BPM | DIASTOLIC BLOOD PRESSURE: 60 MMHG | BODY MASS INDEX: 20.42 KG/M2 | HEIGHT: 60 IN | WEIGHT: 104 LBS | SYSTOLIC BLOOD PRESSURE: 112 MMHG | RESPIRATION RATE: 16 BRPM

## 2018-04-13 DIAGNOSIS — M25.442 FINGER JOINT SWELLING, LEFT: ICD-10-CM

## 2018-04-13 DIAGNOSIS — E78.00 PURE HYPERCHOLESTEROLEMIA: ICD-10-CM

## 2018-04-13 DIAGNOSIS — Z00.00 PE (PHYSICAL EXAM), ROUTINE: Primary | ICD-10-CM

## 2018-04-13 DIAGNOSIS — E11.319 CONTROLLED TYPE 2 DIABETES MELLITUS WITH RETINOPATHY, WITHOUT LONG-TERM CURRENT USE OF INSULIN, MACULAR EDEMA PRESENCE UNSPECIFIED, UNSPECIFIED LATERALITY, UNSPECIFIED RETINOPATHY SEVERITY (HCC): ICD-10-CM

## 2018-04-13 LAB
CREAT 24H UR-MCNC: 123.2 MG/DL
MICROALBUMIN UR-MCNC: <3 UG/ML
MICROALBUMIN/CREAT UR: <2.4 MG/G CREAT (ref 0–30)

## 2018-04-13 PROCEDURE — 99396 PREV VISIT EST AGE 40-64: CPT | Performed by: INTERNAL MEDICINE

## 2018-04-13 PROCEDURE — 93000 ELECTROCARDIOGRAM COMPLETE: CPT | Performed by: INTERNAL MEDICINE

## 2018-04-13 RX ORDER — GLIMEPIRIDE 4 MG/1
4 TABLET ORAL
Qty: 90 TABLET | Refills: 3 | Status: SHIPPED | OUTPATIENT
Start: 2018-04-13

## 2018-04-13 NOTE — ASSESSMENT & PLAN NOTE
BG control worsened with a1C 6.9, goal < 6.5 barbara in light of DM retinopathy; cont met 500mg 2 BID w/meals #360, 3RF - reviewed again MOA for this med as patient is questioning efficacy; augment glimepiride to 4mg QD #90, 3RF; encouraged reg phys activity to decr insulin resistance, moderation in unhealthy starches/sweets; f/u A1C in 3 mos

## 2018-04-13 NOTE — ASSESSMENT & PLAN NOTE
lipids remain bord, near goal with ; decrease saturated fats and cholesterol in the diet; currently no meds

## 2018-04-13 NOTE — PROGRESS NOTES
"Chief Complaint   Patient presents with   • Annual Exam       History of Present Illness  58 y.o. pleasant  woman presents for updated phys examination.  States BGs have been elevated, fasting in the 140s.  Notes minimal exercise over the last 2 years.    Review of Systems  Denies headaches, visual changes, CP, palpitations, SOB, cough, abd pain, n/v/d, difficulty with urination, numbness/tingling, falls, mood changes, lightheadedness, hearing changes, rashes.    Denies vaginal discharge or bleeding (no periods) or breast concerns.    Complains of swelling at the left 3rd finger > 1 yr, decreased bending of only that finger, denies significant assoc'd pain.  Denies trigger or injury.  No other fingers affected     All other ROS reviewed and negative.    [facility]  The following portions of the patient's history were reviewed and updated as appropriate: allergies, current medications, past family history, past medical history, past social history, past surgical history and problem list.    Current Outpatient Prescriptions:   •  aspirin 81 MG tablet, QD  •  Calcium Carbonate-Vitamin D (CALCIUM 500 + D PO), QD  •  Cinnamon 500 MG capsule, QD  •  glimepiride (AMARYL) 2 MG tablet, QD  •  glucose blood (ACCU-CHEK NANCY PLUS) test strip, AD  •  metFORMIN (GLUCOPHAGE) 500 MG tablet, 2 BID w/ meals  •  Omega-3 Fatty Acids (FISH OIL PO),QD    VITALS:  /60 (BP Location: Right arm, Patient Position: Sitting)   Pulse 82   Resp 16   Ht 151.1 cm (59.5\")   Wt 47.2 kg (104 lb)   BMI 20.65 kg/m²     Physical Exam   Constitutional: She is oriented to person, place, and time. She appears well-developed and well-nourished.   HENT:   Head: Normocephalic.   Right Ear: External ear normal.   Left Ear: External ear normal.   Nose: Nose normal.   Mouth/Throat: Oropharynx is clear and moist and mucous membranes are normal. No oropharyngeal exudate.   Eyes: Conjunctivae and EOM are normal. Pupils are equal, round, and reactive to " light.   Neck: Normal range of motion. Neck supple. Carotid bruit is not present (bilaterally). No thyromegaly present.   Cardiovascular: Normal rate, regular rhythm and normal heart sounds.    Pulmonary/Chest: Effort normal and breath sounds normal. No respiratory distress. She has no wheezes. She has no rales.   Abdominal: Soft. Bowel sounds are normal. She exhibits no distension and no mass. There is no hepatosplenomegaly. There is no tenderness.   Genitourinary:   Genitourinary Comments: Breast exam unremarkable without masses, skin changes, nipple discharge, or axillary adenopathy.     Musculoskeletal: Normal range of motion. She exhibits deformity (left 3rd PIP swelling, no erythema or warmth; extension intact; decreased flexion). She exhibits no edema.   No contractures in the palms bilaterally   Lymphadenopathy:     She has no cervical adenopathy.   Neurological: She is alert and oriented to person, place, and time. She has normal reflexes. She displays normal reflexes. No cranial nerve deficit. Coordination normal.   Skin: Skin is warm and dry. No rash noted.   Psychiatric: She has a normal mood and affect. Her behavior is normal.   Nursing note and vitals reviewed.      LABS  Results for orders placed or performed in visit on 04/12/18   Comprehensive Metabolic Panel   Result Value Ref Range    Glucose 164 (H) 70 - 100 mg/dL    BUN 16 9 - 23 mg/dL    Creatinine 0.60 0.60 - 1.30 mg/dL    Sodium 141 132 - 146 mmol/L    Potassium 4.4 3.5 - 5.5 mmol/L    Chloride 103 99 - 109 mmol/L    CO2 31.0 20.0 - 31.0 mmol/L    Calcium 9.6 8.7 - 10.4 mg/dL    Total Protein 7.3 5.7 - 8.2 g/dL    Albumin 4.70 3.20 - 4.80 g/dL    ALT (SGPT) 30 7 - 40 U/L    AST (SGOT) 23 0 - 33 U/L    Alkaline Phosphatase 54 25 - 100 U/L    Total Bilirubin 0.4 0.3 - 1.2 mg/dL    eGFR Non African Amer 102 >60 mL/min/1.73    eGFR  African Amer 124 >60 mL/min/1.73    Globulin 2.6 gm/dL    A/G Ratio 1.8 1.5 - 2.5 g/dL    BUN/Creatinine Ratio 26.7  (H) 7.0 - 25.0    Anion Gap 7.0 3.0 - 11.0 mmol/L   Lipid Panel   Result Value Ref Range    Total Cholesterol 189 0 - 200 mg/dL    Triglycerides 74 0 - 150 mg/dL    HDL Cholesterol 67 (H) 40 - 60 mg/dL    LDL Cholesterol  108 0 - 130 mg/dL   TSH   Result Value Ref Range    TSH 1.121 0.350 - 5.350 mIU/mL   Hemoglobin A1c   Result Value Ref Range    Hemoglobin A1C 6.90 (H) 4.80 - 5.60 %   Vitamin B12   Result Value Ref Range    Vitamin B-12 778 211 - 911 pg/mL   Microalbumin / Creatinine Urine Ratio - Urine, Clean Catch   Result Value Ref Range    Creatinine, Urine 123.2 Not Estab. mg/dL    Microalbumin, Urine <3.0 Not Estab. ug/mL    Microalbumin/Creatinine Ratio <2.4 0.0 - 30.0 mg/g creat   CBC Auto Differential   Result Value Ref Range    WBC 6.72 3.50 - 10.80 10*3/mm3    RBC 4.69 3.89 - 5.14 10*6/mm3    Hemoglobin 13.3 11.5 - 15.5 g/dL    Hematocrit 42.9 34.5 - 44.0 %    MCV 91.5 80.0 - 99.0 fL    MCH 28.4 27.0 - 31.0 pg    MCHC 31.0 (L) 32.0 - 36.0 g/dL    RDW 12.4 11.3 - 14.5 %    RDW-SD 41.5 37.0 - 54.0 fl    MPV 9.8 6.0 - 12.0 fL    Platelets 262 150 - 450 10*3/mm3    Neutrophil % 71.5 (H) 41.0 - 71.0 %    Lymphocyte % 23.1 (L) 24.0 - 44.0 %    Monocyte % 4.3 0.0 - 12.0 %    Eosinophil % 0.6 0.0 - 3.0 %    Basophil % 0.4 0.0 - 1.0 %    Immature Grans % 0.1 0.0 - 0.6 %    Neutrophils, Absolute 4.80 1.50 - 8.30 10*3/mm3    Lymphocytes, Absolute 1.55 0.60 - 4.80 10*3/mm3    Monocytes, Absolute 0.29 0.00 - 1.00 10*3/mm3    Eosinophils, Absolute 0.04 0.00 - 0.30 10*3/mm3    Basophils, Absolute 0.03 0.00 - 0.20 10*3/mm3    Immature Grans, Absolute 0.01 0.00 - 0.03 10*3/mm3   Urinalysis - Urine, Clean Catch   Result Value Ref Range    Color, UA Yellow Yellow, Straw    Appearance, UA Clear Clear    pH, UA 7.0 5.0 - 8.0    Specific Gravity, UA 1.013 1.001 - 1.030    Glucose, UA Negative Negative    Ketones, UA Negative Negative    Bilirubin, UA Negative Negative    Blood, UA Negative Negative    Protein, UA Negative  Negative    Leuk Esterase, UA Small (1+) (A) Negative    Nitrite, UA Negative Negative    Urobilinogen, UA 0.2 E.U./dL 0.2 - 1.0 E.U./dL   Urinalysis, Microscopic Only - Urine, Clean Catch   Result Value Ref Range    RBC, UA 0-2 None Seen, 0-2 /HPF    WBC, UA 0-2 None Seen, 0-2 /HPF    Bacteria, UA None Seen None Seen, Trace /HPF    Squamous Epithelial Cells, UA None Seen None Seen, 0-2 /HPF    Hyaline Casts, UA None Seen 0 - 6 /LPF    Methodology Automated Microscopy      1/18 A1C 6.3      ECG 12 Lead  Date/Time: 4/13/2018 4:14 PM  Performed by: MARK REHMAN  Authorized by: MARK REHMAN   Comparison: compared with previous ECG from 2/27/2017  Similar to previous ECG  Rhythm: sinus rhythm  Rate: normal  BPM: 82  Conduction: conduction normal  ST Segments: ST segments normal  T Waves: T waves normal  QRS axis: normal  Clinical impression comment: stable EKG              ASSESSMENT/PLAN  Problem List Items Addressed This Visit     Controlled type 2 diabetes mellitus with retinopathy, without long-term current use of insulin     BG control worsened with a1C 6.9, goal < 6.5 barbara in light of DM retinopathy; cont met 500mg 2 BID w/meals #360, 3RF - reviewed again MOA for this med as patient is questioning efficacy; augment glimepiride to 4mg QD #90, 3RF; encouraged reg phys activity to decr insulin resistance, moderation in unhealthy starches/sweets; f/u A1C in 3 mos           Relevant Medications    glimepiride (AMARYL) 4 MG tablet    metFORMIN (GLUCOPHAGE) 500 MG tablet    Hyperlipidemia     lipids remain bord, near goal with ; decrease saturated fats and cholesterol in the diet; currently no meds           PE (physical exam), routine - Primary     Health maintenance - flu vacc 9/17; Tdap 4/11, PVX 1/18; rec Zostavax after turns 58; mammo 10/17; Pap 2/16, repeat 2019 unless abnlities; 10/17, repeat 2019-20; colonosc overdue with Dr. Reyes (last 5/11) - rerefer, (+)FH), eye exam with Dr. Mendieta next later this  month; dental exam UTD every 6 mos;  (+) seat belt use         Finger joint swelling, left     L. 3rd PIP, chronic ( > 1 yr); consider most likely due to arthritis change, no assoc'd trigger finger component and currently no assoc'd pain, just decreased flexion; discussed warm compresses and gentle stretches; if worsens or develops new sxs, return for re-eval, consider hand xray, and consider hand ortho           Other Visit Diagnoses    None.         FOLLOW-UP  RTC 3 mos with A1C    Electronically signed by:    Jacinta Lopez MD  04/13/2018

## 2018-04-13 NOTE — ASSESSMENT & PLAN NOTE
Health maintenance - flu vacc 9/17; Tdap 4/11, PVX 1/18; rec Zostavax after turns 58; mammo 10/17; Pap 2/16, repeat 2019 unless abnlities; 10/17, repeat 2019-20; colonosc overdue with Dr. Reyes (last 5/11) - rerefer, (+)FH), eye exam with Dr. Mendieta next later this month; dental exam UTD every 6 mos;  (+) seat belt use

## 2018-04-13 NOTE — ASSESSMENT & PLAN NOTE
L. 3rd PIP, chronic ( > 1 yr); consider most likely due to arthritis change, no assoc'd trigger finger component and currently no assoc'd pain, just decreased flexion; discussed warm compresses and gentle stretches; if worsens or develops new sxs, return for re-eval, consider hand xray, and consider hand ortho

## 2018-05-18 ENCOUNTER — CLINICAL SUPPORT (OUTPATIENT)
Dept: INTERNAL MEDICINE | Facility: CLINIC | Age: 58
End: 2018-05-18

## 2018-05-18 PROCEDURE — 90471 IMMUNIZATION ADMIN: CPT | Performed by: INTERNAL MEDICINE

## 2018-05-18 PROCEDURE — 90715 TDAP VACCINE 7 YRS/> IM: CPT | Performed by: INTERNAL MEDICINE

## 2018-07-17 ENCOUNTER — OFFICE VISIT (OUTPATIENT)
Dept: INTERNAL MEDICINE | Facility: CLINIC | Age: 58
End: 2018-07-17

## 2018-07-17 VITALS
BODY MASS INDEX: 20.11 KG/M2 | DIASTOLIC BLOOD PRESSURE: 60 MMHG | RESPIRATION RATE: 16 BRPM | WEIGHT: 101.25 LBS | SYSTOLIC BLOOD PRESSURE: 114 MMHG | HEART RATE: 86 BPM

## 2018-07-17 DIAGNOSIS — E11.319 CONTROLLED TYPE 2 DIABETES MELLITUS WITH RETINOPATHY, WITHOUT LONG-TERM CURRENT USE OF INSULIN, MACULAR EDEMA PRESENCE UNSPECIFIED, UNSPECIFIED LATERALITY, UNSPECIFIED RETINOPATHY SEVERITY (HCC): Primary | ICD-10-CM

## 2018-07-17 LAB — HBA1C MFR BLD: 6.1 %

## 2018-07-17 PROCEDURE — 83036 HEMOGLOBIN GLYCOSYLATED A1C: CPT | Performed by: INTERNAL MEDICINE

## 2018-07-17 PROCEDURE — 99213 OFFICE O/P EST LOW 20 MIN: CPT | Performed by: INTERNAL MEDICINE

## 2018-07-17 NOTE — ASSESSMENT & PLAN NOTE
Commended patient on improved BG control with A1C 6.1; remains on met 1000mg BID and glimepiride 4mg QD (seems patient is taking it 1/2 BID); encouraged reg phys activity to decr insulin resistance, moderation in unhealthy starches/sweets; f/u A1C in 6 mos

## 2018-07-17 NOTE — PROGRESS NOTES
Chief Complaint   Patient presents with   • Diabetes     3mo fu       History of Present Illness  58 y.o.  woman presents for DM follow-up.  Concerned because FBGs have been 100-110s.     Review of Systems  Denies visual changes, CP, palpitations, SOB, abd pain, n/v.  Has been followed by retinal specialist every 6 mos; now requesting recs for regular eye doctor.. All other ROS reviewed and negative.    [facility]  The following portions of the patient's history were reviewed and updated as appropriate: allergies, current medications, past family history, past medical history, past social history, past surgical history and problem list.      Current Outpatient Prescriptions:   •  aspirin 81 MG tablet, QD  •  Calcium Carbonate-Vitamin D (CALCIUM 500 + D PO), QD  •  Cinnamon 500 MG capsule, QD  •  glimepiride (AMARYL) 4 MG tablet, 1/2 BID  •  metFORMIN (GLUCOPHAGE) 500 MG BID  •  Omega-3 Fatty Acids (FISH OIL PO), QD    VITALS:  /60 (BP Location: Right arm, Patient Position: Sitting)   Pulse 86   Resp 16   Wt 45.9 kg (101 lb 4 oz)   BMI 20.11 kg/m²     Physical Exam   Constitutional: She appears well-developed and well-nourished.   Eyes: Conjunctivae and EOM are normal.   Cardiovascular: Normal rate, regular rhythm and normal heart sounds.    Pulmonary/Chest: Effort normal and breath sounds normal.   Neurological: She is alert.   Psychiatric: She has a normal mood and affect. Her behavior is normal.   Nursing note and vitals reviewed.      LABS  Results for orders placed or performed in visit on 07/17/18   POC Glycosylated Hemoglobin (Hb A1C)   Result Value Ref Range    Hemoglobin A1C 6.1 %     4/18 A1C 6.9    ASSESSMENT/PLAN  Problem List Items Addressed This Visit     Controlled type 2 diabetes mellitus with retinopathy, without long-term current use of insulin (CMS/[facility]) - Primary     Commended patient on improved BG control with A1C 6.1; remains on met 1000mg BID and glimepiride 4mg QD (seems patient is  taking it 1/2 BID); encouraged reg phys activity to decr insulin resistance, moderation in unhealthy starches/sweets; f/u A1C in 6 mos           Relevant Orders    POC Glycosylated Hemoglobin (Hb A1C) (Completed)          FOLLOW-UP  1. Ophtho recs include Dr. Ernst, Dr. Atwood, [facility]  2. Health maintenance - rec Shingrix, counseling given  3. RTC 6 mos with A1C (next wellness due after 4/13/19)    Electronically signed by:    Jacinta Lopez MD  07/17/2018

## 2018-07-25 ENCOUNTER — TELEPHONE (OUTPATIENT)
Dept: INTERNAL MEDICINE | Facility: CLINIC | Age: 58
End: 2018-07-25

## 2018-07-25 NOTE — TELEPHONE ENCOUNTER
PATIENT GOT THE SHINGLES VACCINE.. SHE IS A LITTLE PARANOID AND WANTS TO MAKE SURE THAT IT IS OKAY TO BE AROUND HER  GRANDCHILD NOW.. PLEASE ADVISE AND GIVE PT A CALL. THANKS.

## 2018-07-26 NOTE — TELEPHONE ENCOUNTER
Did she get Zostavax or Shingrix? (so we can document)    Regardless, no concerns currently per CDC with risks

## 2018-09-28 ENCOUNTER — FLU SHOT (OUTPATIENT)
Dept: INTERNAL MEDICINE | Facility: CLINIC | Age: 58
End: 2018-09-28

## 2018-09-28 DIAGNOSIS — Z23 INFLUENZA VACCINE NEEDED: ICD-10-CM

## 2018-09-28 PROCEDURE — 90471 IMMUNIZATION ADMIN: CPT | Performed by: INTERNAL MEDICINE

## 2018-09-28 PROCEDURE — 90674 CCIIV4 VAC NO PRSV 0.5 ML IM: CPT | Performed by: INTERNAL MEDICINE

## 2018-11-06 ENCOUNTER — PRIOR AUTHORIZATION (OUTPATIENT)
Dept: INTERNAL MEDICINE | Facility: CLINIC | Age: 58
End: 2018-11-06